# Patient Record
Sex: MALE | Race: OTHER | Employment: OTHER | URBAN - METROPOLITAN AREA
[De-identification: names, ages, dates, MRNs, and addresses within clinical notes are randomized per-mention and may not be internally consistent; named-entity substitution may affect disease eponyms.]

---

## 2018-07-25 NOTE — PATIENT DISCUSSION
"Acute Care - Physical Therapy Treatment Note  [facility]     Patient Name: Angelita Brambila  : 1962  MRN: [id number]  Today's Date: 2017  Onset of Illness/Injury or Date of Surgery Date: 17  Date of Referral to PT: 17  Referring Physician: Rajendra    Admit Date: 2017    Visit Dx:    ICD-10-CM ICD-9-CM   1. Cerebrovascular accident (CVA), unspecified mechanism I63.9 434.91   2. Hypokalemia E87.6 276.8   3. Hemiparesis affecting left side as late effect of cerebrovascular accident I69.354 438.20     Patient Active Problem List   Diagnosis   • Complete tear of right rotator cuff   • Precordial pain   • Dyspnea on exertion   • Palpitations   • Essential hypertension   • Hyperlipidemia   • Tobacco abuse   • CVA (cerebral vascular accident)               Adult Rehabilitation Note       17 1400 17 1100 17 1400    Rehab Assessment/Intervention    Discipline (P)  other (see comments)   physical therapist assistant student  -LB occupational therapist  -HC occupational therapist  -HC    Document Type (P)  therapy note (daily note)  -LB therapy note (daily note)  -HC therapy note (daily note)  -HC    Subjective Information (P)  agree to therapy  -LB agree to therapy  -HC agree to therapy  -HC    Patient Effort, Rehab Treatment (P)  good  -LB good  -HC good  -HC    Symptoms Noted During/After Treatment (P)  none  -LB none  -HC none  -HC    Symptoms Noted Comment  \"I'm trying to use my left hand with everything.\" \"I want to go home.\"  -HC Pt still confused using remote as cell phone and searching for cigarrettes.  -HC    Precautions/Limitations (P)  fall precautions  -LB fall precautions  -HC fall precautions  -HC    Specific Treatment Considerations   Kaylee vest on upon arrival, RN confirmed okay to take off during tx and to reapply either in bed or chair.  -HC    Recorded by [LB] Krysten Bradley PTA [HC] BRANT Hicks [HC] BRANT Hicks    Pain Assessment    Pain " see #1. Assessment (P)  Akers-Baker FACES  -LB No/denies pain  -HC No/denies pain  -HC    Akers-Evangelista FACES Pain Rating (P)  2  -LB      Pain Type (P)  Acute pain  -LB      Pain Location (P)  Wrist  -LB      Pain Orientation (P)  Right  -LB      Pain Descriptors (P)  Sore;Aching  -LB      Pain Intervention(s) (P)  Repositioned;Ambulation/increased activity  -LB      Response to Interventions (P)  tolerated  -LB      Recorded by [LB] Krysten Bradley PTA [HC] Vida Mcgarry OTR [HC] Vida Mcgarry OTR    Vision Assessment/Intervention    Visual Impairment  inattention to the left;left neglect;needs cues to attend visually  -HC inattention to the left;left neglect;needs cues to attend visually  -HC    Recorded by  [HC] Vida Mcgarry OTR [HC] Vida Mcgarry OTR    Cognitive Assessment/Intervention    Current Cognitive/Communication Assessment (P)  impaired  -LB impaired  -HC impaired  -HC    Orientation Status (P)  oriented to;person;place;situation  -LB oriented to;person;place  -HC oriented to;person  -HC    Follows Commands/Answers Questions (P)  75% of the time  -LB 75% of the time;able to follow single-step instructions;needs cueing;needs increased time;needs repetition  -HC 50% of the time;able to follow single-step instructions;needs cueing;needs increased time;needs repetition  -HC    Personal Safety (P)  moderate impairment  -LB moderate impairment  -HC moderate impairment;at risk behaviors demonstrated;decreased awareness, need for assist;decreased awareness, need for safety;decreased insight to deficits;impulsive  -HC    Personal Safety Interventions (P)  fall prevention program maintained;gait belt;nonskid shoes/slippers when out of bed;supervised activity  -LB fall prevention program maintained;gait belt;nonskid shoes/slippers when out of bed  -HC fall prevention program maintained;gait belt;nonskid shoes/slippers when out of bed  -HC    Recorded by [LB] Krysten Bradley PTA [HC] Vida Mcgarry, OTR [HC] Vida Mcgarry,  OTR    Bed Mobility, Assessment/Treatment    Bed Mobility, Assistive Device (P)  head of bed elevated   HHA  -LB head of bed elevated;bed rails  -HC head of bed elevated;bed rails  -HC    Bed Mob, Supine to Sit, [place] (P)  minimum assist (75% patient effort);verbal cues required   HHA  -LB contact guard assist  -HC contact guard assist;minimum assist (75% patient effort);verbal cues required;set up required  -HC    Bed Mob, Sit to Supine, [place] (P)  minimum assist (75% patient effort);verbal cues required   Help w/ LUE  -LB not tested  -HC minimum assist (75% patient effort);verbal cues required  -HC    Bed Mobility, Safety Issues (P)  decreased use of arms for pushing/pulling   LUE  -LB      Bed Mobility, Impairments (P)  strength decreased;coordination impaired  -LB      Recorded by [LB] Krysten Bradley PTA [HC] Vida Mcgarry, OTR [HC] Vida Mcgarry OTR    Transfer Assessment/Treatment    Transfers, Bed-Chair [place]  contact guard assist;verbal cues required  -HC     Transfers, Chair-Bed [place]  not tested  -HC     Transfers, Sit-Stand [place] (P)  contact guard assist;hand held assist;verbal cues required  -LB contact guard assist;verbal cues required  -HC contact guard assist;verbal cues required  -HC    Transfers, Stand-Sit [place] (P)  hand held assist;verbal cues required  -LB contact guard assist;verbal cues required  -HC contact guard assist;verbal cues required  -HC    Transfers, Sit-Stand-Sit, Assist Device (P)  --   HHA  -LB      Toilet Transfer, [place] (P)  contact guard assist;verbal cues required  -LB      Toilet Transfer, Assistive Device (P)  --   HHA  -LB      Transfer, Safety Issues (P)  balance decreased during turns;step length decreased;weight-shifting ability decreased  -LB      Transfer, Impairments (P)  strength decreased;impaired balance;coordination impaired  -LB      Transfer, Comment  VCs to open eyes  -HC VCs to open eyes, very lethargic  -     Recorded by [LB] Krysten Bradley PTA [HC] BRANT Hicks [HC] BRANT Hicks    Gait Assessment/Treatment    Gait, [place] Level (P)  contact guard assist;verbal cues required  -LB      Gait, Assistive Device (P)  --   HHA  -LB      Gait, Distance (Feet) (P)  400  -LB      Gait, Gait Pattern Analysis (P)  swing-through gait  -LB      Gait, Gait Deviations (P)  ronnell decreased;step length decreased;stride length decreased;weight-shifting ability decreased  -LB      Gait, Safety Issues (P)  balance decreased during turns;step length decreased;weight-shifting ability decreased  -LB      Gait, Impairments (P)  strength decreased;impaired balance;coordination impaired;motor control impaired  -LB      Gait, Comment (P)  Pt tends to veer torwards to right and needs vc to look straight ahead, pt tends to look to the right but corrects w/ vc..  -LB      Recorded by [LB] Krysten Bradley PTA      Functional Mobility    Functional Mobility- Ind. Level   contact guard assist;minimum assist (75% patient effort)  -    Functional Mobility- Device   other (see comments)   HHA  -    Functional Mobility-Distance (Feet)   --   around room from bed and back  -HC    Recorded by   [HC] BRANT Hicks    Motor Skills/Interventions    Additional Documentation (P)  Balance Skills Training (Group)  -LB Fine Motor Coordination Training (Group)  -HC     Recorded by [LB] Krysten Bradley PTA [HC] BRANT Hicks     Balance Skills Training    Sitting-Level of Assistance (P)  Close supervision  -LB      Sitting-Balance Support (P)  Feet supported  -LB      Sitting-Balance Activities (P)  Trunk control activities  -LB      Sitting # of Minutes (P)  --   30sec  -LB      Standing-Level of Assistance (P)  Contact guard  -LB      Static Standing Balance Support (P)  No upper extremity supported  -LB      Standing-Balance Activities (P)  Weight Shift R-L  -LB      Gait Balance-Level of Assistance (P)  Contact guard  -LB      Gait  "Balance Support (P)  No upper extremity supported  -LB      Gait Balance Activities (P)  backwards;side-stepping;tandem  -LB      Recorded by [LB] Krysten Bradley PTA      Therapy Exercises    Left Upper Extremity   AROM:;10 reps;supine;elbow flexion/extension;hand pumps;pronation/supination;shoulder extension/flexion   shoulder 3/4 range with scaption; slow motor control  -HC    Recorded by   [HC] BRANT Hicks    Fine Motor Coordination Training    Detail (Fine Motor Coordination Training)  Pt incoporating L hand during functional tasks while opening crayon box and stabilizing coloring book with L hand. VCs to attend to L during tasks.  -HC     Recorded by  [HC] BRANT Hicks     Positioning and Restraints    Pre-Treatment Position (P)  in bed  -LB in bed  -HC in bed  -HC    Post Treatment Position (P)  bed  -LB chair  -HC bed  -HC    In Bed (P)  supine;fowlers;call light within reach;encouraged to call for assist;exit alarm on  -LB  supine;call light within reach;encouraged to call for assist;exit alarm on  -HC    In Chair  sitting;call light within reach;encouraged to call for assist;exit alarm on  -HC     Restraints   reapplied:;vest  -HC    Recorded by [LB] Krysten Bradley PTA [HC] BRANT Hicks [HC] BRANT Hicks      12/06/17 1100 12/06/17 0900 12/05/17 1300    Rehab Assessment/Intervention    Discipline speech language pathologist  -SA physical therapist  -EE speech language pathologist  -AW    Document Type therapy note (daily note)  -SA therapy note (daily note)  -EE therapy note (daily note)  -AW    Subjective Information agree to therapy  -SA agree to therapy  -EE agree to therapy  -AW    Patient Effort, Rehab Treatment adequate  -SA good  -EE adequate  -AW    Symptoms Noted During/After Treatment fatigue  -SA none  -EE other (see comments)  -AW    Symptoms Noted Comment Nsg stated pt more confused d/t \"detoxing\". Nsg states not a good day for sp/lang/cog eval. Will defer unitl 12/7.   " "-SA  Nursing reported increased confusion. Numerous family members present reporting pt anxious and agitated. Pt stated, \"it's not a good time.\" Pt seen for VFSS f/u and diet tolerance. Will defer cognitive eval today.  -AW    Precautions/Limitations swallowing precautions;fall precautions  -SA fall precautions  -EE fall precautions;swallowing precautions  -AW    Specific Treatment Considerations Posey vest on pt during tx  -SA Posey vest applied; ok for PT per So PABLO. Somewhat lethargic; RN reports pt given ativan overnight  -EE     Patient Response to Treatment tearful  -SA      Recorded by [SA] Heather Amin, MS CCC-SLP [EE] Saritha Way, PT [AW] Diane Nelson, MS CCC-SLP    Pain Assessment    Pain Assessment  0-10  -EE     Pain Score  4  -EE     Pain Type  Acute pain  -EE     Pain Location  Wrist  -EE     Pain Orientation  Right  -EE     Pain Intervention(s)  Repositioned;Rest  -EE     Response to Interventions  tolerated  -EE     Recorded by  [EE] Saritha Way, PT     Cognitive Assessment/Intervention    Current Cognitive/Communication Assessment impaired  -SA impaired  -EE impaired   pt appeared agitated, decreased insight, confused  -AW    Orientation Status oriented to;person;place  -SA oriented to;person  -EE oriented to;person  -AW    Follows Commands/Answers Questions 50% of the time;able to follow single-step instructions;needs cueing;needs increased time;needs repetition  -SA 75% of the time;able to follow single-step instructions;needs cueing;needs repetition  -% of the time;able to follow single-step instructions  -AW    Personal Safety decreased awareness, need for safety;decreased awareness, need for assist;decreased insight to deficits  -SA moderate impairment;at risk behaviors demonstrated;decreased awareness, need for assist;decreased awareness, need for safety;decreased insight to deficits  -EE decreased awareness, need for assist;decreased awareness, need for safety  -AW    Personal Safety " Interventions fall prevention program maintained  -SA fall prevention program maintained;gait belt;muscle strengthening facilitated;nonskid shoes/slippers when out of bed;supervised activity  -EE fall prevention program maintained  -AW    Recorded by [SA] Heather Amin MS CCC-SLP [EE] Saritha Way, PT [AW] Diane Nelson MS CCC-SLP    Dysphagia/Swallow Intervention    Dysphagia/Swallow Intervention Pt appears tolerating diet; mech soft/thin; pt w/ moderate dysarthria; confused. breath sounds clear; po intake fair d/t detoxing per nsg.   -SA  Reviewed results of VFSS with pt and family. Pt and family verbalized understanding of diet and strategies. Pt is tolerating mech soft diet, lungs clear.   -AW    Recorded by [SA] Heather Amin MS CCC-SLP  [AW] Diane Nelson, MS CCC-SLP    Improve oral skills    To Improve Oral Skills, patient will: Increase lip closure;Increase tongue tip elevation;Increase tongue lateralization;Increase tongue A-P movement;Increase back of tongue control  -SA      Oral Skills Progress following model;with consistent cues  -SA      Comments: Improve Oral Skills Attempted active lingual ex's. Pt not able to complete d/t confusion; however, she was able to tolerate passive oral stretches. Completed buccal and labial stretches w/ good tolerance  -SA      Recorded by [SA] Heather Amin MS CCC-SLP      Improve tongue base & pharyngeal wall squeeze    To improve tongue base & pharyngeal wall squeeze, patient will: Complete tongue hold swallow  -SA      Tongue Base/Pharyngeal Wall Squeeze Progress with consistent cues;0%;following model  -SA      Comments: Improve tongue base & pharyngeal wall squeeze Unable to complete d/t confusion.  -SA      Recorded by [SA] Heather Amin MS CCC-SLP      Bed Mobility, Assessment/Treatment    Bed Mobility, Assistive Device  head of bed elevated;bed rails  -EE     Bed Mob, Supine to Sit, [place]  contact guard assist;minimum assist (75% patient effort);verbal cues required   -EE     Bed Mob, Sit to Supine, [place]  minimum assist (75% patient effort);verbal cues required  -EE     Bed Mobility, Impairments  strength decreased;coordination impaired  -EE     Recorded by  [EE] Saritha Way PT     Transfer Assessment/Treatment    Transfers, Sit-Stand [place]  contact guard assist;minimum assist (75% patient effort);verbal cues required;nonverbal cues required (demo/gesture);hand held assist;1 person + 1 person to manage equipment  -EE     Transfers, Stand-Sit [place]  contact guard assist;verbal cues required  -EE     Transfer, Safety Issues  balance decreased during turns;weight-shifting ability decreased  -EE     Transfer, Impairments  strength decreased;impaired balance;coordination impaired;motor control impaired  -EE     Recorded by  [EE] Saritha Way PT     Gait Assessment/Treatment    Gait, [place] Level  contact guard assist;minimum assist (75% patient effort);verbal cues required;nonverbal cues required (demo/gesture);hand held assist;1 person + 1 person to manage equipment  -EE     Gait, Distance (Feet)  400  -EE     Gait, Gait Deviations  ronnell decreased;step length decreased;stride length decreased;weight-shifting ability decreased;forward flexed posture  -EE     Gait, Safety Issues  step length decreased;balance decreased during turns  -EE     Gait, Impairments  strength decreased;impaired balance;coordination impaired;impaired vision;motor control impaired  -EE     Gait, Comment  Verbal cues to scan to L and avoid obstacles on L. Increased forward flexed posture today; cues for upright posture.  -EE     Recorded by  [EE] Saritha Way PT     Positioning and Restraints    Pre-Treatment Position  in bed  -EE     Post Treatment Position  bed  -EE     In Bed  fowlers;call light within reach;encouraged to call for assist;exit alarm on;with nsg  -EE     Restraints  reapplied:;vest  -EE     Recorded by  [EE] Saritha Way PT       12/05/17 1105          Rehab  Assessment/Intervention    Discipline physical therapist  -EE      Document Type therapy note (daily note)  -EE      Subjective Information agree to therapy;no complaints  -EE      Patient Effort, Rehab Treatment good  -EE      Symptoms Noted During/After Treatment none  -EE      Precautions/Limitations fall precautions;other (see comments)   possible R wrist fx  -EE      Recorded by [EE] Saritha Way PT      Pain Assessment    Pain Assessment 0-10  -EE      Pain Score 3  -EE      Pain Type Chronic pain  -EE      Pain Location Back  -EE      Pain Orientation Lower  -EE      Pain Intervention(s) Repositioned;Ambulation/increased activity  -EE      Response to Interventions tolerated  -EE      Recorded by [EE] Saritha Way, PT      Vision Assessment/Intervention    Visual Impairment inattention to the left;left neglect;needs cues to attend visually  -EE      Recorded by [EE] Saritha Way PT      Cognitive Assessment/Intervention    Current Cognitive/Communication Assessment impaired  -EE      Orientation Status oriented to;person;place  -EE      Follows Commands/Answers Questions 100% of the time;able to follow single-step instructions;needs cueing;needs increased time;needs repetition  -EE      Personal Safety mild impairment;at risk behaviors demonstrated;decreased awareness, need for safety;decreased insight to deficits  -EE      Personal Safety Interventions fall prevention program maintained;gait belt;nonskid shoes/slippers when out of bed;supervised activity  -EE      Recorded by [EE] Saritha Way PT      Bed Mobility, Assessment/Treatment    Bed Mob, Supine to Sit, [place] not tested  -EE      Bed Mob, Sit to Supine, [place] not tested  -EE      Bed Mobility, Comment up in room with [facility] staff, then up in chair  -EE      Recorded by [EE] Saritha Way PT      Transfer Assessment/Treatment    Transfers, Sit-Stand [place] contact guard assist;verbal cues required  -EE      Transfers, Stand-Sit  [place] contact guard assist;verbal cues required  -EE      Transfer, Safety Issues balance decreased during turns  -EE      Transfer, Impairments strength decreased;impaired balance;coordination impaired  -EE      Transfer, Comment cues to scan to L during turns to avoid obstacles  -EE      Recorded by [EE] Saritha Way, PT      Gait Assessment/Treatment    Gait, [place] Level contact guard assist;minimum assist (75% patient effort);verbal cues required  -EE      Gait, Distance (Feet) 400  -EE      Gait, Gait Deviations ronnell decreased;narrow base;step length decreased;stride length decreased  -EE      Gait, Safety Issues step length decreased;balance decreased during turns  -EE      Gait, Impairments strength decreased;impaired balance;impaired vision;coordination impaired  -EE      Gait, Comment Cueing required to scan to L and avoid obstacles on L side. Able to ambulate and maintain balance w/o assist at R UE today.   -EE      Recorded by [EE] Saritha Way PT      Positioning and Restraints    Pre-Treatment Position in bed  -EE      Post Treatment Position chair  -EE      In Chair sitting;call light within reach;encouraged to call for assist;exit alarm on;notified nsg  -EE      Recorded by [EE] Saritha Way PT        User Key  (r) = Recorded By, (t) = Taken By, (c) = Cosigned By    Initials Name Effective Dates    AW Diane Nelson, MS CCC-SLP 04/13/15 -     EE Saritha Way, PT 12/01/15 -     SA Heather Amin, MS CCC-SLP 07/25/17 -     HC Vida Mcgarry, OTR 08/17/17 -     LB Krysten Bradley, PTA 11/15/17 -                 IP PT Goals       12/03/17 1128          Bed Mobility PT LTG    Bed Mobility PT LTG, Date Established 12/03/17  -PC      Bed Mobility PT LTG, Time to Achieve 1 wk  -PC      Bed Mobility PT LTG, Activity Type supine to sit/sit to supine  -PC      Bed Mobility PT LTG, [place] Level supervision required  -PC      Transfer Training PT LTG    Transfer Training PT LTG, Date Established  12/03/17  -PC      Transfer Training PT LTG, Time to Achieve 1 wk  -PC      Transfer Training PT LTG, Activity Type sit to stand/stand to sit  -PC      Transfer Training PT LTG, [place] Level contact guard assist  -PC      Gait Training PT LTG    Gait Training Goal PT LTG, Date Established 12/03/17  -PC      Gait Training Goal PT LTG, Time to Achieve 1 wk  -PC      Gait Training Goal PT LTG, [place] Level contact guard assist  -PC      Gait Training Goal PT LTG, Distance to Achieve 150 ft with appropriate assistive device  -PC        User Key  (r) = Recorded By, (t) = Taken By, (c) = Cosigned By    Initials Name Provider Type    SHEELA Champion, PT Physical Therapist          Physical Therapy Education     Title: PT OT SLP Therapies (Active)     Topic: Physical Therapy (Done)     Point: Mobility training (Done)    Learning Progress Summary    Learner Readiness Method Response Comment Documented by Status   Patient Acceptance E,TB,D VU,DU,NR  LB 12/07/17 1446 Done    Acceptance E,TB NR  EE 12/06/17 0945 Active    Acceptance E,TB NR  EE 12/05/17 1116 Active    Acceptance E,TB NR  EE 12/04/17 1500 Active    Acceptance E,D NR  PC 12/03/17 1127 Active               Point: Home exercise program (Done)    Learning Progress Summary    Learner Readiness Method Response Comment Documented by Status   Patient Acceptance E,TB,D VU,DU,NR  LB 12/07/17 1446 Done    Acceptance E,D NR  PC 12/03/17 1127 Active               Point: Body mechanics (Done)    Learning Progress Summary    Learner Readiness Method Response Comment Documented by Status   Patient Acceptance E,TB,D VU,DU,NR  LB 12/07/17 1446 Done    Acceptance E,TB NR  EE 12/06/17 0945 Active    Acceptance E,TB NR  EE 12/05/17 1116 Active    Acceptance E,TB NR  EE 12/04/17 1500 Active    Acceptance E,D NR  PC 12/03/17 1127 Active               Point: Precautions (Done)    Learning Progress Summary    Learner Readiness Method Response Comment Documented by Status    Patient Acceptance E,TB,D VU,DU,NR  LB 12/07/17 1446 Done    Acceptance E,TB NR  EE 12/06/17 0945 Active    Acceptance E,TB NR  EE 12/05/17 1116 Active    Acceptance E,TB NR  EE 12/04/17 1500 Active    Acceptance E,D NR  PC 12/03/17 1127 Active                      User Key     Initials Effective Dates Name Provider Type Discipline    PC 12/01/15 -  Haily Champion, PT Physical Therapist PT    EE 12/01/15 -  Saritha Way, PT Physical Therapist PT    LB 11/15/17 -  Krysten Bradley, SEAN PT Student PT                    PT Recommendation and Plan  Anticipated Discharge Disposition: inpatient rehabilitation facility  Planned Therapy Interventions: bed mobility training, balance training, gait training, home exercise program, strengthening, transfer training  PT Frequency: daily  Plan of Care Review  Plan Of Care Reviewed With: (P) patient  Outcome Summary/Follow up Plan: (P) Pt requires HHA w/ bed and transfer activities. Pt has increased function in LUE. Pt performed all activites w/o any LOB and required vc to look  forward and  directed to look at an object in the center of the oscar to prevent only looking and veering to the right. Pt performed standing balance exercises well and only had to be corrected to turn torso towards the wall when side stepping to the left due to tendency to look right.          Outcome Measures       12/07/17 1400 12/07/17 1100 12/06/17 1500    How much help from another person do you currently need...    Turning from your back to your side while in flat bed without using bedrails? (P)  3  -LB      Moving from lying on back to sitting on the side of a flat bed without bedrails? (P)  3  -LB      Moving to and from a bed to a chair (including a wheelchair)? (P)  3  -LB      Standing up from a chair using your arms (e.g., wheelchair, bedside chair)? (P)  3  -LB      Climbing 3-5 steps with a railing? (P)  2  -LB      To walk in hospital room? (P)  3  -LB      AM-PAC 6 Clicks Score (P)  17  -LB       How much help from another is currently needed...    Putting on and taking off regular lower body clothing?  3  -HC 3  -HC    Bathing (including washing, rinsing, and drying)  3  -HC 3  -HC    Toileting (which includes using toilet bed pan or urinal)  3  -HC 3  -HC    Putting on and taking off regular upper body clothing  3  -HC 3  -HC    Taking care of personal grooming (such as brushing teeth)  3  -HC 3  -HC    Eating meals  3  -HC 3  -HC    Score  18  -HC 18  -HC    Functional Assessment    Outcome Measure Options (P)  AM-PAC 6 Clicks Basic Mobility (PT)  -LB AM-PAC 6 Clicks Daily Activity (OT)  -HC AM-PAC 6 Clicks Daily Activity (OT)  -HC      12/06/17 0900 12/05/17 1200 12/05/17 1100    How much help from another person do you currently need...    Turning from your back to your side while in flat bed without using bedrails? 3  -EE  3  -EE    Moving from lying on back to sitting on the side of a flat bed without bedrails? 3  -EE  3  -EE    Moving to and from a bed to a chair (including a wheelchair)? 3  -EE  3  -EE    Standing up from a chair using your arms (e.g., wheelchair, bedside chair)? 3  -EE  3  -EE    Climbing 3-5 steps with a railing? 2  -EE  2  -EE    To walk in hospital room? 3  -EE  3  -EE    AM-PAC 6 Clicks Score 17  -EE  17  -EE    How much help from another is currently needed...    Putting on and taking off regular lower body clothing?  3  -HC     Bathing (including washing, rinsing, and drying)  3  -HC     Toileting (which includes using toilet bed pan or urinal)  3  -HC     Putting on and taking off regular upper body clothing  3  -HC     Taking care of personal grooming (such as brushing teeth)  3  -HC     Eating meals  3  -HC     Score  18  -HC     Modified [place] Scale    Modified [place] Scale  4 - Moderately severe disability.  Unable to walk without assistance, and unable to attend to own bodily needs without assistance.  -HC     Functional Assessment    Outcome Measure Options AM-PAC  6 Clicks Basic Mobility (PT)  -EE AM-PAC 6 Clicks Daily Activity (OT);Modified Gordon  -HC AM-PAC 6 Clicks Basic Mobility (PT)  -EE      12/04/17 1500          How much help from another person do you currently need...    Turning from your back to your side while in flat bed without using bedrails? 3  -EE      Moving from lying on back to sitting on the side of a flat bed without bedrails? 3  -EE      Moving to and from a bed to a chair (including a wheelchair)? 3  -EE      Standing up from a chair using your arms (e.g., wheelchair, bedside chair)? 3  -EE      Climbing 3-5 steps with a railing? 2  -EE      To walk in hospital room? 3  -EE      AM-PAC 6 Clicks Score 17  -EE      Functional Assessment    Outcome Measure Options AM-PAC 6 Clicks Basic Mobility (PT)  -EE        User Key  (r) = Recorded By, (t) = Taken By, (c) = Cosigned By    Initials Name Provider Type    EE Saritha Way, PT Physical Therapist     Vida Mcgarry, OTR Occupational Therapist    ANIRUDH Bradley PTA PT Student           Time Calculation:         PT Charges       12/07/17 1430 12/07/17 0936       Time Calculation    Start Time (P)  1401  -LB      Stop Time (P)  1425  -LB      Time Calculation (min) (P)  24 min  -LB      PT Received On (P)  12/07/17  -LB      PT - Next Appointment (P)  12/08/17  -LB 12/07/17  -EE       User Key  (r) = Recorded By, (t) = Taken By, (c) = Cosigned By    Initials Name Provider Type    MARCIAL Way, PT Physical Therapist    ANIRUDH Bradley PTA PT Student          Therapy Charges for Today     Code Description Service Date Service Provider Modifiers Qty    02962516896 HC PT THER PROC EA 15 MIN 12/7/2017 Krysten Bradley PTA GP 2    13133307230 HC PT THER SUPP EA 15 MIN 12/7/2017 Krysten Bradley PTA GP 1          PT G-Codes  Outcome Measure Options: (P) AM-[facility] 6 Clicks Basic Mobility (PT)    Krysten Bradley PTA  12/7/2017

## 2021-10-20 NOTE — PATIENT DISCUSSION
Maxitrol TID OU for 10 days Stop. Disc poss infection causing irriation of eyes. Warm compresses BID OU.

## 2021-12-30 ENCOUNTER — EMERGENCY VISIT (OUTPATIENT)
Dept: URBAN - METROPOLITAN AREA CLINIC 47 | Facility: CLINIC | Age: 66
End: 2021-12-30

## 2021-12-30 DIAGNOSIS — B02.39: ICD-10-CM

## 2021-12-30 PROCEDURE — 92012 INTRM OPH EXAM EST PATIENT: CPT

## 2021-12-30 ASSESSMENT — TONOMETRY
OD_IOP_MMHG: 15
OS_IOP_MMHG: 15